# Patient Record
Sex: FEMALE | Race: WHITE | NOT HISPANIC OR LATINO | Employment: FULL TIME | URBAN - METROPOLITAN AREA
[De-identification: names, ages, dates, MRNs, and addresses within clinical notes are randomized per-mention and may not be internally consistent; named-entity substitution may affect disease eponyms.]

---

## 2019-11-08 ENCOUNTER — OFFICE VISIT (OUTPATIENT)
Dept: FAMILY MEDICINE CLINIC | Facility: CLINIC | Age: 24
End: 2019-11-08

## 2019-11-08 VITALS
BODY MASS INDEX: 24.45 KG/M2 | SYSTOLIC BLOOD PRESSURE: 120 MMHG | OXYGEN SATURATION: 97 % | WEIGHT: 138 LBS | HEART RATE: 97 BPM | HEIGHT: 63 IN | DIASTOLIC BLOOD PRESSURE: 70 MMHG

## 2019-11-08 DIAGNOSIS — J45.40 MODERATE PERSISTENT ASTHMA, UNSPECIFIED WHETHER COMPLICATED: Primary | ICD-10-CM

## 2019-11-08 PROBLEM — J45.909 ASTHMA: Status: ACTIVE | Noted: 2019-11-08

## 2019-11-08 PROCEDURE — 99203 OFFICE O/P NEW LOW 30 MIN: CPT | Performed by: FAMILY MEDICINE

## 2019-11-08 RX ORDER — ALBUTEROL SULFATE 90 UG/1
2 AEROSOL, METERED RESPIRATORY (INHALATION) 3 TIMES DAILY PRN
COMMUNITY
Start: 2019-11-06 | End: 2019-11-08 | Stop reason: SDUPTHER

## 2019-11-08 RX ORDER — CETIRIZINE HYDROCHLORIDE 10 MG/1
10 TABLET ORAL DAILY
COMMUNITY

## 2019-11-08 RX ORDER — ALBUTEROL SULFATE 90 UG/1
2 AEROSOL, METERED RESPIRATORY (INHALATION) 3 TIMES DAILY PRN
Qty: 8 G | Refills: 11 | Status: SHIPPED | OUTPATIENT
Start: 2019-11-08

## 2019-11-08 RX ORDER — NORETHINDRONE ACETATE AND ETHINYL ESTRADIOL 1; 20 MG/1; UG/1
1 TABLET ORAL DAILY
COMMUNITY
Start: 2019-10-11

## 2019-11-08 NOTE — PROGRESS NOTES
Subjective   Kerry Serrano is a 24 y.o. female.     Chief Complaint   Patient presents with   • Establish Care       History of Present Illness     Acute complaints:  Kerry Serrano is a 24 y.o. female who presents today to establish care.  She has a history of asthma and has been on a rescue inhaler since she was a child.  She has previously seen an allergist, but has not since she was in high school.  She estimates this is about how long it has been also since she had pulmonary function testing.  Her last hospitalization was maybe when she was in college, she has been using the albuterol inhaler 2-3 times daily for the past few years.  She has never been on a daily inhaler.  She ran out of her inhaler a few days ago.  She previously was taking Zyrtec for allergies, but this was unrelated to her asthma.  She works in marketing.    This patient is accompanied by their self who contributes to the history of their care.    The following portions of the patient's history were reviewed and updated as appropriate: allergies, current medications, past family history, past medical history, past social history, past surgical history and problem list.    Active Ambulatory Problems     Diagnosis Date Noted   • Asthma 11/08/2019     Resolved Ambulatory Problems     Diagnosis Date Noted   • No Resolved Ambulatory Problems     Past Medical History:   Diagnosis Date   • Asthma      History reviewed. No pertinent surgical history.  Family History   Problem Relation Age of Onset   • Hypertension Mother    • Thyroid disease Mother    • Migraines Mother    • Obesity Mother    • Mental illness Sister    • Obesity Sister    • Cancer Maternal Grandmother    • Diabetes Maternal Grandfather    • Hypertension Maternal Grandfather    • Hyperlipidemia Maternal Grandfather    • Arthritis Paternal Grandmother      Social History     Socioeconomic History   • Marital status: Single     Spouse name: Not on file   • Number of children: Not on file  "  • Years of education: Not on file   • Highest education level: Not on file   Tobacco Use   • Smoking status: Never Smoker   • Smokeless tobacco: Never Used   Substance and Sexual Activity   • Alcohol use: Yes     Comment: 1 drink weekly    • Drug use: No   • Sexual activity: Yes     Birth control/protection: OCP         Review of Systems   Constitutional: Negative.    HENT: Negative.    Eyes: Negative.    Respiratory: Negative for cough, chest tightness, shortness of breath and wheezing.    Cardiovascular: Negative for chest pain and palpitations.   Gastrointestinal: Negative for abdominal distention, abdominal pain, constipation, diarrhea, nausea and vomiting.   Musculoskeletal: Negative for gait problem and joint swelling.   Skin: Negative for color change and wound.   Psychiatric/Behavioral: Negative for agitation and hallucinations.       Objective   Blood pressure 120/70, pulse 97, height 160 cm (63\"), weight 62.6 kg (138 lb), SpO2 97 %.  Nursing note reviewed  Physical Exam  Const: NAD, A&Ox4, Pleasant, Cooperative  Eyes: EOMI, no conjunctivitis  ENT: No nasal discharge present, neck supple  Cardiac: Regular rate and rhythm, no cyanosis  Resp: Respiratory rate within normal limits, no increased work of breathing, no audible wheezing or retractions noted  GI: No distention or ascites  MSK: Motor and sensation grossly intact in bilateral upper extremities  Neurologic: CN II-XII grossly intact  Psych: Appropriate mood and behavior.  Skin: Pink, warm, dry  Procedures  Assessment/Plan   Problem List Items Addressed This Visit        Respiratory    Asthma - Primary    Relevant Medications    Tiotropium Bromide Monohydrate (SPIRIVA RESPIMAT) 1.25 MCG/ACT aerosol solution inhaler    albuterol sulfate  (90 Base) MCG/ACT inhaler    Other Relevant Orders    Full Pulmonary Function Test With Bronchodilator        #1 asthma  Moderate to severe persistent based on symptoms  I would recommend a daily inhaler, she " was given a sample of Spiriva Respimat today, I would like her to start this 2 puffs daily  A refill of her rescue inhaler was also sent to her pharmacy  She is in need of updated pulmonary function testing.  We will see her back after this is completed, and after a few weeks to assess her response to the Spiriva.    We will plan to obtain previous records both for chronic preventative care as well as those related to the current episode of care.  Any records that the patient brought with her today were reviewed personally by me during the visit today and will be scanned into the chart for posterity.    There are no Patient Instructions on file for this visit.    Return in about 4 weeks (around 12/6/2019).    Ambulatory progress note signed and attested to by Dean Schaffer D.O.

## 2019-11-15 ENCOUNTER — OFFICE VISIT (OUTPATIENT)
Dept: PULMONOLOGY | Facility: CLINIC | Age: 24
End: 2019-11-15

## 2019-11-15 DIAGNOSIS — J45.40 MODERATE PERSISTENT ASTHMA, UNSPECIFIED WHETHER COMPLICATED: ICD-10-CM

## 2019-11-15 PROCEDURE — 94729 DIFFUSING CAPACITY: CPT | Performed by: INTERNAL MEDICINE

## 2019-11-15 PROCEDURE — 94726 PLETHYSMOGRAPHY LUNG VOLUMES: CPT | Performed by: INTERNAL MEDICINE

## 2019-11-15 PROCEDURE — 94375 RESPIRATORY FLOW VOLUME LOOP: CPT | Performed by: INTERNAL MEDICINE
